# Patient Record
Sex: MALE | Race: WHITE
[De-identification: names, ages, dates, MRNs, and addresses within clinical notes are randomized per-mention and may not be internally consistent; named-entity substitution may affect disease eponyms.]

---

## 2017-12-20 ENCOUNTER — HOSPITAL ENCOUNTER (INPATIENT)
Dept: HOSPITAL 96 - M.ERS | Age: 77
LOS: 2 days | Discharge: HOME | DRG: 189 | End: 2017-12-22
Attending: INTERNAL MEDICINE | Admitting: INTERNAL MEDICINE
Payer: COMMERCIAL

## 2017-12-20 VITALS — SYSTOLIC BLOOD PRESSURE: 170 MMHG | DIASTOLIC BLOOD PRESSURE: 74 MMHG

## 2017-12-20 VITALS — BODY MASS INDEX: 25.33 KG/M2 | WEIGHT: 187 LBS | HEIGHT: 72.01 IN

## 2017-12-20 VITALS — SYSTOLIC BLOOD PRESSURE: 220 MMHG | DIASTOLIC BLOOD PRESSURE: 98 MMHG

## 2017-12-20 VITALS — SYSTOLIC BLOOD PRESSURE: 178 MMHG | DIASTOLIC BLOOD PRESSURE: 78 MMHG

## 2017-12-20 DIAGNOSIS — R09.02: ICD-10-CM

## 2017-12-20 DIAGNOSIS — N18.3: ICD-10-CM

## 2017-12-20 DIAGNOSIS — Z88.8: ICD-10-CM

## 2017-12-20 DIAGNOSIS — I12.9: ICD-10-CM

## 2017-12-20 DIAGNOSIS — Z86.73: ICD-10-CM

## 2017-12-20 DIAGNOSIS — J11.1: ICD-10-CM

## 2017-12-20 DIAGNOSIS — Z91.041: ICD-10-CM

## 2017-12-20 DIAGNOSIS — Z87.442: ICD-10-CM

## 2017-12-20 DIAGNOSIS — Z87.891: ICD-10-CM

## 2017-12-20 DIAGNOSIS — Z79.82: ICD-10-CM

## 2017-12-20 DIAGNOSIS — Z79.899: ICD-10-CM

## 2017-12-20 DIAGNOSIS — E11.22: ICD-10-CM

## 2017-12-20 DIAGNOSIS — E87.6: ICD-10-CM

## 2017-12-20 DIAGNOSIS — J96.00: Primary | ICD-10-CM

## 2017-12-20 DIAGNOSIS — Z79.84: ICD-10-CM

## 2017-12-20 LAB
ABSOLUTE EOSINOPHILS: 0.1 THOU/UL (ref 0–0.7)
ABSOLUTE MONOCYTES: 1.8 THOU/UL (ref 0–1.2)
ALBUMIN SERPL-MCNC: 3.6 G/DL (ref 3.4–5)
ALP SERPL-CCNC: 66 U/L (ref 46–116)
ALT SERPL-CCNC: 23 U/L (ref 30–65)
ANION GAP SERPL CALC-SCNC: 10 MMOL/L (ref 7–16)
APTT BLD: 28.8 SECONDS (ref 25–31.3)
AST SERPL-CCNC: 25 U/L (ref 15–37)
BILIRUB SERPL-MCNC: 0.4 MG/DL
BILIRUB UR-MCNC: NEGATIVE MG/DL
BUN SERPL-MCNC: 21 MG/DL (ref 7–18)
CALCIUM SERPL-MCNC: 7.9 MG/DL (ref 8.5–10.1)
CHLORIDE SERPL-SCNC: 100 MMOL/L (ref 98–107)
CO2 SERPL-SCNC: 29 MMOL/L (ref 21–32)
COLOR UR: (no result)
CREAT SERPL-MCNC: 1.4 MG/DL (ref 0.6–1.3)
EOSINOPHIL NFR BLD: 1 %
GLUCOSE SERPL-MCNC: 212 MG/DL (ref 70–99)
GRANULOCYTES NFR BLD MANUAL: 59 %
HCT VFR BLD CALC: 38.7 % (ref 42–52)
HGB BLD-MCNC: 13 GM/DL (ref 14–18)
INR PPP: 1.1
KETONES UR STRIP-MCNC: NEGATIVE MG/DL
LYMPHOCYTES # BLD: 1 THOU/UL (ref 0.8–5.3)
LYMPHOCYTES NFR BLD AUTO: 14 %
MCH RBC QN AUTO: 31.2 PG (ref 26–34)
MCHC RBC AUTO-ENTMCNC: 33.7 G/DL (ref 28–37)
MCV RBC: 92.5 FL (ref 80–100)
MONOCYTES NFR BLD: 26 %
MPV: 8.3 FL. (ref 7.2–11.1)
NEUTROPHILS # BLD: 4.1 THOU/UL (ref 1.6–8.1)
NT-PRO BRAIN NAT PEPTIDE: 1432 PG/ML (ref ?–300)
NUCLEATED RBCS: 0 /100WBC
PLATELET # BLD EST: ADEQUATE 10*3/UL
PLATELET COUNT*: 165 THOU/UL (ref 150–400)
POTASSIUM SERPL-SCNC: 3.1 MMOL/L (ref 3.5–5.1)
PROT SERPL-MCNC: 6.5 G/DL (ref 6.4–8.2)
PROT UR QL STRIP: (no result)
PROTHROMBIN TIME: 10.6 SECONDS (ref 9.2–11.5)
RBC # BLD AUTO: 4.19 MIL/UL (ref 4.5–6)
RBC # UR STRIP: NEGATIVE /UL
RBC MORPH BLD: NORMAL
RDW-CV: 12.9 % (ref 10.5–14.5)
SODIUM SERPL-SCNC: 139 MMOL/L (ref 136–145)
SP GR UR STRIP: 1.02 (ref 1–1.03)
TROPONIN-I LEVEL: 0.07 NG/ML (ref ?–0.06)
URINE CLARITY: CLEAR
URINE GLUCOSE-RANDOM: (no result)
URINE LEUKOCYTES-REFLEX: NEGATIVE
URINE NITRITE-REFLEX: NEGATIVE
UROBILINOGEN UR STRIP-ACNC: 0.2 E.U./DL (ref 0.2–1)
WBC # BLD AUTO: 7 THOU/UL (ref 4–11)

## 2017-12-21 VITALS — SYSTOLIC BLOOD PRESSURE: 177 MMHG | DIASTOLIC BLOOD PRESSURE: 79 MMHG

## 2017-12-21 VITALS — DIASTOLIC BLOOD PRESSURE: 68 MMHG | SYSTOLIC BLOOD PRESSURE: 174 MMHG

## 2017-12-21 VITALS — SYSTOLIC BLOOD PRESSURE: 166 MMHG | DIASTOLIC BLOOD PRESSURE: 73 MMHG

## 2017-12-21 VITALS — DIASTOLIC BLOOD PRESSURE: 65 MMHG | SYSTOLIC BLOOD PRESSURE: 141 MMHG

## 2017-12-21 VITALS — SYSTOLIC BLOOD PRESSURE: 157 MMHG | DIASTOLIC BLOOD PRESSURE: 74 MMHG

## 2017-12-21 VITALS — DIASTOLIC BLOOD PRESSURE: 74 MMHG | SYSTOLIC BLOOD PRESSURE: 136 MMHG

## 2017-12-21 VITALS — SYSTOLIC BLOOD PRESSURE: 155 MMHG | DIASTOLIC BLOOD PRESSURE: 64 MMHG

## 2017-12-21 LAB
ANION GAP SERPL CALC-SCNC: 11 MMOL/L (ref 7–16)
BUN SERPL-MCNC: 19 MG/DL (ref 7–18)
CALCIUM SERPL-MCNC: 7.4 MG/DL (ref 8.5–10.1)
CHLORIDE SERPL-SCNC: 101 MMOL/L (ref 98–107)
CO2 SERPL-SCNC: 28 MMOL/L (ref 21–32)
CREAT SERPL-MCNC: 1.2 MG/DL (ref 0.6–1.3)
GLUCOSE SERPL-MCNC: 175 MG/DL (ref 70–99)
HCT VFR BLD CALC: 37 % (ref 42–52)
HGB BLD-MCNC: 12.6 GM/DL (ref 14–18)
MAGNESIUM SERPL-MCNC: 1.3 MG/DL (ref 1.8–2.4)
MCH RBC QN AUTO: 31.4 PG (ref 26–34)
MCHC RBC AUTO-ENTMCNC: 34 G/DL (ref 28–37)
MCV RBC: 92.3 FL (ref 80–100)
MPV: 8 FL. (ref 7.2–11.1)
PLATELET COUNT*: 148 THOU/UL (ref 150–400)
POTASSIUM SERPL-SCNC: 3 MMOL/L (ref 3.5–5.1)
RBC # BLD AUTO: 4.01 MIL/UL (ref 4.5–6)
RDW-CV: 13 % (ref 10.5–14.5)
SODIUM SERPL-SCNC: 140 MMOL/L (ref 136–145)
WBC # BLD AUTO: 5.8 THOU/UL (ref 4–11)

## 2017-12-21 NOTE — EKG
[place] [address]
Phone:  [phone]                     ELECTROCARDIOGRAM REPORT      
_______________________________________________________________________________
 
Name:       OMID NEVAREZ               Room:           [address]    ADM IN  
M.R.#:  [id number]      Account #:      [id number]  
Admission:  17     Attend Phys:    Shabbir Escobar MD 
Discharge:               Date of Birth:  [birth year]  
         Report #: [id number]
    [id number]
_______________________________________________________________________________
THIS REPORT FOR:  //name//                      
 
                         [facility] ED
                                       
Test Date:    2017               Test Time:    18:49:30
Pat Name:     OMID NEVAREZ           Department:   
Patient ID:   [id number]            Room:         [facility]
Gender:       M                        Technician:   MS
:          1940               Requested By: Ramesh Pablo
Order Number: [id number] MD:   Cisco Waldron
                                 Measurements
Intervals                              Axis          
Rate:         77                       P:            1
NY:           178                      QRS:          -43
QRSD:         89                       T:            56
QT:           392                                    
QTc:          444                                    
                           Interpretive Statements
Sinus rhythm
Inferior infarct, old
Anterior infarct, old
Compared to ECG 10/28/2017 11:25:51
No significant changes
 
Electronically Signed On 2017 17:16:30 CST by Cisco Waldron
[email or web address]
 
 
 
 
 
 
 
 
 
 
 
 
 
 
 
 
 
  <ELECTRONICALLY SIGNED>
                                           By: Cisco Waldron MD, [facility]     
  17     1716
D: 17 1849   _____________________________________
T: 17 1849   Cisco Waldron MD, [facility]       /EPI

## 2017-12-22 VITALS — DIASTOLIC BLOOD PRESSURE: 82 MMHG | SYSTOLIC BLOOD PRESSURE: 179 MMHG

## 2017-12-22 VITALS — SYSTOLIC BLOOD PRESSURE: 179 MMHG | DIASTOLIC BLOOD PRESSURE: 82 MMHG

## 2017-12-22 VITALS — SYSTOLIC BLOOD PRESSURE: 159 MMHG | DIASTOLIC BLOOD PRESSURE: 70 MMHG

## 2018-02-07 ENCOUNTER — HOSPITAL ENCOUNTER (OUTPATIENT)
Dept: HOSPITAL 96 - M.LAB | Age: 78
Discharge: HOME | End: 2018-02-07
Payer: COMMERCIAL

## 2018-02-07 VITALS — SYSTOLIC BLOOD PRESSURE: 142 MMHG | DIASTOLIC BLOOD PRESSURE: 65 MMHG

## 2018-02-07 VITALS — SYSTOLIC BLOOD PRESSURE: 138 MMHG | DIASTOLIC BLOOD PRESSURE: 61 MMHG

## 2018-02-07 VITALS — SYSTOLIC BLOOD PRESSURE: 143 MMHG | DIASTOLIC BLOOD PRESSURE: 65 MMHG

## 2018-02-07 VITALS — DIASTOLIC BLOOD PRESSURE: 63 MMHG | SYSTOLIC BLOOD PRESSURE: 132 MMHG

## 2018-02-07 DIAGNOSIS — Z86.61: ICD-10-CM

## 2018-02-07 DIAGNOSIS — Z98.890: ICD-10-CM

## 2018-02-07 DIAGNOSIS — Z79.82: ICD-10-CM

## 2018-02-07 DIAGNOSIS — Z91.041: ICD-10-CM

## 2018-02-07 DIAGNOSIS — I12.9: ICD-10-CM

## 2018-02-07 DIAGNOSIS — Z88.8: ICD-10-CM

## 2018-02-07 DIAGNOSIS — Z86.73: ICD-10-CM

## 2018-02-07 DIAGNOSIS — N18.3: ICD-10-CM

## 2018-02-07 DIAGNOSIS — Z87.442: ICD-10-CM

## 2018-02-07 DIAGNOSIS — Z45.41: Primary | ICD-10-CM

## 2018-02-07 DIAGNOSIS — Z79.899: ICD-10-CM

## 2018-02-07 DIAGNOSIS — E11.22: ICD-10-CM

## 2018-02-07 LAB
ABSOLUTE BASOPHILS: 0.1 THOU/UL (ref 0–0.2)
ABSOLUTE EOSINOPHILS: 0.5 THOU/UL (ref 0–0.7)
ABSOLUTE MONOCYTES: 0.7 THOU/UL (ref 0–1.2)
ALBUMIN SERPL-MCNC: 4 G/DL (ref 3.5–4.8)
APTT BLD: 26.1 SECONDS (ref 25–31.3)
BASOPHILS NFR BLD AUTO: 0.7 %
CLARITY CSF: CLEAR
COLOR CSF: COLORLESS
CSF RBC: 0 /MM3
CSF WBC: 1 /MM3 (ref 0–10)
EOSINOPHIL NFR BLD: 7 %
GLUCOSE CSF-MCNC: 114 MG/DL (ref 40–70)
GRANULOCYTES NFR BLD MANUAL: 65.7 %
HCT VFR BLD CALC: 39.3 % (ref 42–52)
HGB BLD-MCNC: 13.3 GM/DL (ref 14–18)
INR PPP: 1.1
LYMPHOCYTES # BLD: 1.2 THOU/UL (ref 0.8–5.3)
LYMPHOCYTES NFR BLD AUTO: 17.2 %
MCH RBC QN AUTO: 31.4 PG (ref 26–34)
MCHC RBC AUTO-ENTMCNC: 34 G/DL (ref 28–37)
MCV RBC: 92.5 FL (ref 80–100)
MONOCYTES NFR BLD: 9.4 %
MPV: 7.9 FL. (ref 7.2–11.1)
NEUTROPHILS # BLD: 4.7 THOU/UL (ref 1.6–8.1)
NUCLEATED RBCS: 0 /100WBC
PLATELET COUNT*: 207 THOU/UL (ref 150–400)
PROT CSF-MCNC: 61.2 MG/DL (ref 15–45)
PROTHROMBIN TIME: 10.6 SECONDS (ref 9.2–11.5)
RBC # BLD AUTO: 4.24 MIL/UL (ref 4.5–6)
RDW-CV: 13.2 % (ref 10.5–14.5)
SPECIMEN VOL 24H UR: 29 ML
WBC # BLD AUTO: 7.2 THOU/UL (ref 4–11)

## 2018-02-09 LAB
ALB CSF/SERPL: 9 {RATIO} (ref 0–8)
ALBUMIN CSF-MCNC: 34 MG/DL (ref 11–48)
IGG/ALB CLEAR SER+CSF-RTO: 0.6 (ref 0–0.7)

## 2018-04-19 ENCOUNTER — HOSPITAL ENCOUNTER (OUTPATIENT)
Dept: HOSPITAL 96 - M.RAD | Age: 78
End: 2018-04-19
Attending: UROLOGY
Payer: COMMERCIAL

## 2018-04-19 DIAGNOSIS — I10: Primary | ICD-10-CM

## 2018-04-19 DIAGNOSIS — Z85.528: ICD-10-CM

## 2018-04-19 DIAGNOSIS — I51.7: ICD-10-CM

## 2018-04-19 LAB
ALBUMIN SERPL-MCNC: 3.5 G/DL (ref 3.4–5)
ALP SERPL-CCNC: 88 U/L (ref 46–116)
ALT SERPL-CCNC: 15 U/L (ref 30–65)
ANION GAP SERPL CALC-SCNC: 6 MMOL/L (ref 7–16)
AST SERPL-CCNC: 18 U/L (ref 15–37)
BILIRUB SERPL-MCNC: 0.4 MG/DL
BUN SERPL-MCNC: 27 MG/DL (ref 7–18)
CALCIUM SERPL-MCNC: 8.8 MG/DL (ref 8.5–10.1)
CHLORIDE SERPL-SCNC: 104 MMOL/L (ref 98–107)
CO2 SERPL-SCNC: 33 MMOL/L (ref 21–32)
CREAT SERPL-MCNC: 1.5 MG/DL (ref 0.6–1.3)
GLUCOSE SERPL-MCNC: 347 MG/DL (ref 70–99)
POTASSIUM SERPL-SCNC: 3.7 MMOL/L (ref 3.5–5.1)
PROT SERPL-MCNC: 6.4 G/DL (ref 6.4–8.2)
SODIUM SERPL-SCNC: 143 MMOL/L (ref 136–145)

## 2018-05-21 ENCOUNTER — HOSPITAL ENCOUNTER (OUTPATIENT)
Dept: HOSPITAL 96 - M.ULTRA | Age: 78
End: 2018-05-21
Attending: UROLOGY
Payer: COMMERCIAL

## 2018-05-21 DIAGNOSIS — Z90.5: ICD-10-CM

## 2018-05-21 DIAGNOSIS — N28.89: Primary | ICD-10-CM

## 2018-05-21 DIAGNOSIS — Z85.528: ICD-10-CM

## 2018-07-12 ENCOUNTER — HOSPITAL ENCOUNTER (OUTPATIENT)
Dept: HOSPITAL 96 - M.LAB | Age: 78
End: 2018-07-12
Attending: UROLOGY
Payer: COMMERCIAL

## 2018-07-12 DIAGNOSIS — N40.0: Primary | ICD-10-CM

## 2018-11-12 ENCOUNTER — HOSPITAL ENCOUNTER (OUTPATIENT)
Dept: HOSPITAL 96 - M.ULTRA | Age: 78
End: 2018-11-12
Attending: UROLOGY
Payer: COMMERCIAL

## 2018-11-12 DIAGNOSIS — N26.1: ICD-10-CM

## 2018-11-12 DIAGNOSIS — C64.9: Primary | ICD-10-CM

## 2018-11-12 DIAGNOSIS — Z87.442: ICD-10-CM

## 2021-01-14 ENCOUNTER — HOSPITAL ENCOUNTER (INPATIENT)
Dept: HOSPITAL 35 - ER | Age: 81
LOS: 2 days | Discharge: HOME | DRG: 871 | End: 2021-01-16
Attending: HOSPITALIST | Admitting: HOSPITALIST
Payer: COMMERCIAL

## 2021-01-14 VITALS — HEIGHT: 72.01 IN | BODY MASS INDEX: 25.06 KG/M2 | WEIGHT: 185 LBS

## 2021-01-14 VITALS — SYSTOLIC BLOOD PRESSURE: 104 MMHG | DIASTOLIC BLOOD PRESSURE: 60 MMHG

## 2021-01-14 DIAGNOSIS — I34.0: ICD-10-CM

## 2021-01-14 DIAGNOSIS — K92.1: ICD-10-CM

## 2021-01-14 DIAGNOSIS — R77.8: ICD-10-CM

## 2021-01-14 DIAGNOSIS — N17.0: ICD-10-CM

## 2021-01-14 DIAGNOSIS — J96.01: ICD-10-CM

## 2021-01-14 DIAGNOSIS — E11.9: ICD-10-CM

## 2021-01-14 DIAGNOSIS — I25.2: ICD-10-CM

## 2021-01-14 DIAGNOSIS — I25.10: ICD-10-CM

## 2021-01-14 DIAGNOSIS — Z86.73: ICD-10-CM

## 2021-01-14 DIAGNOSIS — N17.9: ICD-10-CM

## 2021-01-14 DIAGNOSIS — Z87.442: ICD-10-CM

## 2021-01-14 DIAGNOSIS — Z95.1: ICD-10-CM

## 2021-01-14 DIAGNOSIS — E78.5: ICD-10-CM

## 2021-01-14 DIAGNOSIS — I48.0: ICD-10-CM

## 2021-01-14 DIAGNOSIS — I27.20: ICD-10-CM

## 2021-01-14 DIAGNOSIS — I31.3: ICD-10-CM

## 2021-01-14 DIAGNOSIS — J18.9: ICD-10-CM

## 2021-01-14 DIAGNOSIS — Z91.041: ICD-10-CM

## 2021-01-14 DIAGNOSIS — Z79.899: ICD-10-CM

## 2021-01-14 DIAGNOSIS — J91.8: ICD-10-CM

## 2021-01-14 DIAGNOSIS — A41.9: Primary | ICD-10-CM

## 2021-01-14 DIAGNOSIS — Z85.528: ICD-10-CM

## 2021-01-14 DIAGNOSIS — Z20.822: ICD-10-CM

## 2021-01-14 DIAGNOSIS — Z79.82: ICD-10-CM

## 2021-01-14 DIAGNOSIS — Z88.8: ICD-10-CM

## 2021-01-14 LAB
ALBUMIN SERPL-MCNC: 2.9 G/DL (ref 3.4–5)
ALT SERPL-CCNC: 17 U/L (ref 30–65)
ANION GAP SERPL CALC-SCNC: 13 MMOL/L (ref 7–16)
AST SERPL-CCNC: 24 U/L (ref 15–37)
BASOPHILS NFR BLD AUTO: 0.6 % (ref 0–2)
BILIRUB SERPL-MCNC: 0.7 MG/DL (ref 0.2–1)
BUN SERPL-MCNC: 26 MG/DL (ref 7–18)
CALCIUM SERPL-MCNC: 8.9 MG/DL (ref 8.5–10.1)
CHLORIDE SERPL-SCNC: 105 MMOL/L (ref 98–107)
CO2 SERPL-SCNC: 24 MMOL/L (ref 21–32)
CREAT SERPL-MCNC: 1.9 MG/DL (ref 0.7–1.3)
EOSINOPHIL NFR BLD: 2.5 % (ref 0–3)
ERYTHROCYTE [DISTWIDTH] IN BLOOD BY AUTOMATED COUNT: 13.3 % (ref 10.5–14.5)
GLUCOSE SERPL-MCNC: 290 MG/DL (ref 74–106)
GRANULOCYTES NFR BLD MANUAL: 77 % (ref 36–66)
HCT VFR BLD CALC: 33.5 % (ref 42–52)
HGB BLD-MCNC: 10.8 GM/DL (ref 14–18)
LYMPHOCYTES NFR BLD AUTO: 8.8 % (ref 24–44)
MCH RBC QN AUTO: 30.2 PG (ref 26–34)
MCHC RBC AUTO-ENTMCNC: 32.3 G/DL (ref 28–37)
MCV RBC: 93.6 FL (ref 80–100)
MONOCYTES NFR BLD: 11.1 % (ref 1–8)
NEUTROPHILS # BLD: 8.3 THOU/UL (ref 1.4–8.2)
PLATELET # BLD: 336 THOU/UL (ref 150–400)
POTASSIUM SERPL-SCNC: 4.2 MMOL/L (ref 3.5–5.1)
PROT SERPL-MCNC: 6.1 G/DL (ref 6.4–8.2)
RBC # BLD AUTO: 3.58 MIL/UL (ref 4.5–6)
SODIUM SERPL-SCNC: 142 MMOL/L (ref 136–145)
TROPONIN I SERPL-MCNC: 0.06 NG/ML (ref ?–0.06)
WBC # BLD AUTO: 10.8 THOU/UL (ref 4–11)

## 2021-01-14 PROCEDURE — 10797: CPT

## 2021-01-14 NOTE — EKG
[place] [address] [facility] [employer]
[place]  [address]
Phone:  [phone]                    ELECTROCARDIOGRAM REPORT      
_______________________________________________________________________________
 
Name:       OMID NEVAREZ              Room #:                     [facility]#:      [id number]     Account #:      [id number]  
Admission:  21    Attend Phys:                          
Discharge:              Date of Birth:  [birth year]  
                                                          Report #: [id number]
   [id number]
_______________________________________________________________________________
                         [facility] ED
                                       
Test Date:    2021               Test Time:    11:57:01
Pat Name:     OMID NEVAREZ           Department:   
Patient ID:   [id number]            Room:          
Gender:       M                        Technician:   DEEPAK
:          1940               Requested By: Teja Kelly
Order Number: [id number] MD:   Jesus Alberto Christiansen
                                 Measurements
Intervals                              Axis          
Rate:         93                       P:            
[place]:                                    QRS:          -19
QRSD:         90                       T:            206
QT:           411                                    
QTc:          512                                    
                           Interpretive Statements
Atrial fibrillation
Inferior infarct, old
Lateral leads are also involved
Prolonged QT interval
Compared to ECG 2009 10:35:07
Prolonged QT interval now present
Sinus rhythm no longer present
Myocardial infarct finding still present
Electronically Signed On 2021 14:07:18 CST by Jesus Alberto Christiansen
[email or web address]
 
 
 
 
 
 
 
 
 
 
 
 
 
 
 
 
 
  <ELECTRONICALLY SIGNED>
   By: Jesus Alberto Christiansen MD, [facility]    
  21     1407
D: 21 1157                           _____________________________________
T: 21 1157                           Jesus Alberto Christiansen MD, FACC      /EPI

## 2021-01-15 VITALS — DIASTOLIC BLOOD PRESSURE: 93 MMHG | SYSTOLIC BLOOD PRESSURE: 147 MMHG

## 2021-01-15 VITALS — DIASTOLIC BLOOD PRESSURE: 94 MMHG | SYSTOLIC BLOOD PRESSURE: 172 MMHG

## 2021-01-15 VITALS — SYSTOLIC BLOOD PRESSURE: 113 MMHG | DIASTOLIC BLOOD PRESSURE: 72 MMHG

## 2021-01-15 VITALS — SYSTOLIC BLOOD PRESSURE: 147 MMHG | DIASTOLIC BLOOD PRESSURE: 93 MMHG

## 2021-01-15 VITALS — SYSTOLIC BLOOD PRESSURE: 131 MMHG | DIASTOLIC BLOOD PRESSURE: 81 MMHG

## 2021-01-15 LAB
ANION GAP SERPL CALC-SCNC: 14 MMOL/L (ref 7–16)
BUN SERPL-MCNC: 25 MG/DL (ref 7–18)
CALCIUM SERPL-MCNC: 9.1 MG/DL (ref 8.5–10.1)
CHLORIDE SERPL-SCNC: 108 MMOL/L (ref 98–107)
CO2 SERPL-SCNC: 22 MMOL/L (ref 21–32)
CREAT SERPL-MCNC: 1.5 MG/DL (ref 0.7–1.3)
ERYTHROCYTE [DISTWIDTH] IN BLOOD BY AUTOMATED COUNT: 13.3 % (ref 10.5–14.5)
GLUCOSE SERPL-MCNC: 178 MG/DL (ref 74–106)
HCT VFR BLD CALC: 33.7 % (ref 42–52)
HGB BLD-MCNC: 11.1 GM/DL (ref 14–18)
MCH RBC QN AUTO: 30.5 PG (ref 26–34)
MCHC RBC AUTO-ENTMCNC: 33.1 G/DL (ref 28–37)
MCV RBC: 92.3 FL (ref 80–100)
PLATELET # BLD: 335 THOU/UL (ref 150–400)
POTASSIUM SERPL-SCNC: 3.5 MMOL/L (ref 3.5–5.1)
RBC # BLD AUTO: 3.65 MIL/UL (ref 4.5–6)
SODIUM SERPL-SCNC: 144 MMOL/L (ref 136–145)
WBC # BLD AUTO: 8.8 THOU/UL (ref 4–11)

## 2021-01-15 NOTE — NUR
PT TO THE UNIT FROM THE ER.  PT UNHAPPY ON ARRIVAL STATING THAT HE DID NOT
SLEEP WELL IN THE ER.  PT ORIENTED TO ROOM AND BEDSAPCE.  ASSESSMENT AS
CHARTED - MEDS AS PER MAR -  PT WITH CO'S OF UPSET STOMACH AFTER DINNER -
GIVEN ZOFRAN.  PT UP TO THE BATHROOM WITH MN ASSIST AND USE OF WALKER.  SEEN
BY PHYS AND OCC THERAPY TODAY.  PT WITH NO CO'S OF PAIN.  SON UP TO SEE
PATIENT THIS AFTERNOON.  NO CO'S AT THE PRESENT TIME.

## 2021-01-15 NOTE — 2DMMODE
[facility]
[address] [place]   [address]                   2 D/M-MODE ECHOCARDIOGRAM     
_______________________________________________________________________________
 
Name:       OMID NEVAREZ              Room #:         210-P       ADM IN  
..#:      [id number]                       Account #:      [id number]  
Admission:  01/14/21    Attend Phys:    Kaden Barrera MD     
Discharge:              Date of Birth:  [birth year]  
                                                          Report #: [id number]
                                                                    [id number]
_______________________________________________________________________________
THIS REPORT FOR:  
 
cc:  Aleksander Corcoran MD, Rene P. MD Lammoglia, Francisco J. MD                                        
                                                                       ~
 
--------------- APPROVED REPORT --------------
 
 
Study performed:  01/15/2021 11:09:14
 
EXAM: Comprehensive 2D, Doppler, and color-flow 
Echocardiogram 
Patient Location: ER    
Room #:  4     Status:  routine
 
        BSA:         2.06
HR: 93 bpm   BP:          144/82 mmHg 
Rhythm: Atrial Fibrillation     
 
Other Information 
Study Quality: Good
 
Indications
Atrial Fibrillation
Dyspnea 
CAD
Elevated Troponin
Hypertension/HDD
 
2D Dimensions
RVDd:  33.50 mm  
IVSd:  14.10 (7-11mm) LVOT Diam:  22.66 (18-24mm) 
LVDd:  48.43 mm  
PWd:  14.07 (7-11mm) Ascending Ao:  32.29 (22-36mm)
LVDs:  39.09 (25-40mm) 
Aortic Root:  31.93 mm IVC:  26.00 mm
 
Volumes
Left Atrial Volume (Systole) 
Single Plane 4CH:  80.01 mL Single Plane 2CH:  59.79 mL
    LA ESV Index:  36.00 mL/m2
 
Aortic Valve
AoV Peak Yuri.:  1.81 m/s 
 
 
[facility]
[address]
[place]  [address]
Phone:  [phone]                    2 D/M-MODE ECHOCARDIOGRAM     
_______________________________________________________________________________
 
Name:            OMID NEVAREZ              Room #:        210-P       [facility] [place]
[facility].#:           [id number]          Account #:     [id number]  
Admission:       01/14/21         Attend Phys:   Kaden aBrrera MD 
Discharge:                  Date of Birth: [birth year]  
                         Report #:      [id number]
        [id number]
_______________________________________________________________________________
AO Peak Gr.:  13.10 mmHg LVOT Max PG:  3.35 mmHg
    LVOT Max V:  0.92 m/s
MAURILIO Vmax: 2.04 cm2  
 
Pulmonary Valve
PV Peak Yuri.:  1.12 m/s PV Peak Gr.:  5.00 mmHg
 
Tricuspid Valve
TR Peak Yuri.:  2.41 m/s  
TR Peak Gr.:  23.20 mmHg 
    PA Pressure:  33.00 mmHg
 
Left Ventricle
The left ventricle is normal size. There is mild lateral wall 
hypokinesis Mild to moderate concentric left ventricular hypertrophy. 
Left ventricular systolic function is mildly decreased. LVEF is 
40-45%. This study is not technically sufficient to allow evaluation 
of the LV diastolic function due to atrial fibrillation.
 
Right Ventricle
The right ventricle is normal size. The right ventricular systolic 
function is normal.
 
Atria
Left atrium is mildly dilated. The right atrium size is 
normal.
 
Aortic Valve
The aortic valve is normal in structure. Aortic valve is calcified. 
No aortic regurgitation is present. There is no aortic valvular 
stenosis.
 
Mitral Valve
The mitral valve is normal in structure. Mild mitral regurgitation. 
No evidence of mitral valve stenosis.
 
Tricuspid Valve
The tricuspid valve is normal in structure. There is trace tricuspid 
regurgitation. Estimated PAP 33 mmHg. There is mild pulmonary 
hypertension.
 
Pulmonic Valve
The pulmonary valve is normal in structure. There is no pulmonic 
valvular regurgitation.
 
[employer]
 
 
[facility]
[address]
[place]  [address]
Phone:  [phone]                    2 D/M-MODE ECHOCARDIOGRAM     
_______________________________________________________________________________
 
Name:            OMID NEVAREZ              Room #:        210-[facility] [place]
.R.#:           [id number]          Account #:     [id number]  
Admission:       01/14/21         Attend Phys:   Kaden Barrera MD 
Discharge:                  Date of Birth: [birth year]  
                         Report #:      [id number]
        [id number]
_______________________________________________________________________________
The aortic root is normal in size. IVC is dilated and collapses 
<50% with inspiration.
 
Pericardium
Moderate to large circumferential pericardial effusion. No echo 
indications of pericardial tamponade.
 
<Conclusion>
The left ventricle is normal size.
Mild to moderate concentric left ventricular hypertrophy.
LVEF is 40-45%.
There is mild lateral wall hypokinesis
Left atrium is mildly dilated.
The aortic valve is normal in structure.
Aortic valve is calcified.
The mitral valve is normal in structure.
Mild mitral regurgitation.
The tricuspid valve is normal in structure.
There is trace tricuspid regurgitation. Estimated PAP 33  mmHg.
There is mild pulmonary hypertension.
The pulmonary valve is normal in structure.
Moderate to large circumferential pericardial effusion.
No echo indications of pericardial tamponade.
 
 
 
 
 
 
 
 
 
 
 
 
 
 
 
 
 
 
 
 
 
  <ELECTRONICALLY SIGNED>
   By: Bobo Tubbs MD    
  01/15/21     1214
D: 01/15/21 1214                           _____________________________________
T: 01/15/21 1214                           Bobo Tubbs MD      /INF

## 2021-01-16 VITALS — SYSTOLIC BLOOD PRESSURE: 129 MMHG | DIASTOLIC BLOOD PRESSURE: 83 MMHG

## 2021-01-16 VITALS — DIASTOLIC BLOOD PRESSURE: 86 MMHG | SYSTOLIC BLOOD PRESSURE: 142 MMHG

## 2021-01-16 VITALS — DIASTOLIC BLOOD PRESSURE: 78 MMHG | SYSTOLIC BLOOD PRESSURE: 138 MMHG

## 2021-01-16 LAB
ANION GAP SERPL CALC-SCNC: 12 MMOL/L (ref 7–16)
BUN SERPL-MCNC: 26 MG/DL (ref 7–18)
CALCIUM SERPL-MCNC: 8.4 MG/DL (ref 8.5–10.1)
CHLORIDE SERPL-SCNC: 110 MMOL/L (ref 98–107)
CO2 SERPL-SCNC: 22 MMOL/L (ref 21–32)
CREAT SERPL-MCNC: 1.6 MG/DL (ref 0.7–1.3)
ERYTHROCYTE [DISTWIDTH] IN BLOOD BY AUTOMATED COUNT: 13.3 % (ref 10.5–14.5)
GLUCOSE SERPL-MCNC: 153 MG/DL (ref 74–106)
HCT VFR BLD CALC: 33.6 % (ref 42–52)
HGB BLD-MCNC: 10.9 GM/DL (ref 14–18)
IRON SERPL-MCNC: 19 UG/DL (ref 65–175)
MCH RBC QN AUTO: 30.4 PG (ref 26–34)
MCHC RBC AUTO-ENTMCNC: 32.4 G/DL (ref 28–37)
MCV RBC: 93.9 FL (ref 80–100)
PLATELET # BLD: 334 THOU/UL (ref 150–400)
POTASSIUM SERPL-SCNC: 3.6 MMOL/L (ref 3.5–5.1)
RBC # BLD AUTO: 3.58 MIL/UL (ref 4.5–6)
SAO2 % BLD FROM PO2: 13 % (ref 20–39)
SODIUM SERPL-SCNC: 144 MMOL/L (ref 136–145)
TIBC SERPL-MCNC: 149 UG/DL (ref 250–450)
WBC # BLD AUTO: 9.3 THOU/UL (ref 4–11)

## 2021-01-16 NOTE — NUR
PT IS ALERT AND ORIENTED, WITH FLAT AFFECT. PT WAS SEEN BY DR GARCIA. PT
DISPLAYS NO SYNCOPE, AND DENIES SHORTNESS OF AIR. PT DISCHARGE EDUCATION
INCLUDES NEW MEDICATIONS, AND FOLOW UP WITH PRIMARY CARE PHYSICIAN. PT TO
DISCHARGE HOME WITH DAUGHTER IN PERSONAL VEHICLE. NO CONCERNS AT THIS TIME.

## 2021-01-16 NOTE — NUR
PATIENT SLEPT THROUGH MOST OF THE NIGHT. A&OX4. ASSESSMENTS AS CHARTED. MEDS
GIVEN PER EMAR. NO COMPLAINTS OF PAIN OR SOA. PT UP TO BR WITH WALKER AND 1
ASSIST. AFIB ON THE MONITOR. CONTINUE TO ASSESS CLOSELY ACCORDING TO POC.